# Patient Record
Sex: MALE | Race: WHITE | NOT HISPANIC OR LATINO | URBAN - METROPOLITAN AREA
[De-identification: names, ages, dates, MRNs, and addresses within clinical notes are randomized per-mention and may not be internally consistent; named-entity substitution may affect disease eponyms.]

---

## 2023-06-29 ENCOUNTER — APPOINTMENT (OUTPATIENT)
Dept: LAB | Facility: HOSPITAL | Age: 38
End: 2023-06-29
Payer: COMMERCIAL

## 2023-06-29 DIAGNOSIS — E78.00 PURE HYPERCHOLESTEROLEMIA: ICD-10-CM

## 2023-06-29 DIAGNOSIS — R53.81 DEBILITY: ICD-10-CM

## 2023-06-29 LAB
ALBUMIN SERPL BCP-MCNC: 4.7 G/DL (ref 3.5–5)
ALP SERPL-CCNC: 29 U/L (ref 34–104)
ALT SERPL W P-5'-P-CCNC: 17 U/L (ref 7–52)
ANION GAP SERPL CALCULATED.3IONS-SCNC: 4 MMOL/L
AST SERPL W P-5'-P-CCNC: 20 U/L (ref 13–39)
BASOPHILS # BLD AUTO: 0.05 THOUSANDS/ÂΜL (ref 0–0.1)
BASOPHILS NFR BLD AUTO: 1 % (ref 0–1)
BILIRUB SERPL-MCNC: 1.05 MG/DL (ref 0.2–1)
BUN SERPL-MCNC: 19 MG/DL (ref 5–25)
CALCIUM SERPL-MCNC: 9.6 MG/DL (ref 8.4–10.2)
CHLORIDE SERPL-SCNC: 102 MMOL/L (ref 96–108)
CHOLEST SERPL-MCNC: 234 MG/DL
CO2 SERPL-SCNC: 30 MMOL/L (ref 21–32)
CREAT SERPL-MCNC: 1.12 MG/DL (ref 0.6–1.3)
EOSINOPHIL # BLD AUTO: 0.08 THOUSAND/ÂΜL (ref 0–0.61)
EOSINOPHIL NFR BLD AUTO: 2 % (ref 0–6)
ERYTHROCYTE [DISTWIDTH] IN BLOOD BY AUTOMATED COUNT: 12.6 % (ref 11.6–15.1)
GFR SERPL CREATININE-BSD FRML MDRD: 84 ML/MIN/1.73SQ M
GLUCOSE P FAST SERPL-MCNC: 89 MG/DL (ref 65–99)
HCT VFR BLD AUTO: 43 % (ref 36.5–49.3)
HDLC SERPL-MCNC: 47 MG/DL
HGB BLD-MCNC: 14.1 G/DL (ref 12–17)
IMM GRANULOCYTES # BLD AUTO: 0.01 THOUSAND/UL (ref 0–0.2)
IMM GRANULOCYTES NFR BLD AUTO: 0 % (ref 0–2)
LDLC SERPL CALC-MCNC: 165 MG/DL (ref 0–100)
LYMPHOCYTES # BLD AUTO: 1.78 THOUSANDS/ÂΜL (ref 0.6–4.47)
LYMPHOCYTES NFR BLD AUTO: 37 % (ref 14–44)
MCH RBC QN AUTO: 29.5 PG (ref 26.8–34.3)
MCHC RBC AUTO-ENTMCNC: 32.8 G/DL (ref 31.4–37.4)
MCV RBC AUTO: 90 FL (ref 82–98)
MONOCYTES # BLD AUTO: 0.41 THOUSAND/ÂΜL (ref 0.17–1.22)
MONOCYTES NFR BLD AUTO: 8 % (ref 4–12)
NEUTROPHILS # BLD AUTO: 2.55 THOUSANDS/ÂΜL (ref 1.85–7.62)
NEUTS SEG NFR BLD AUTO: 52 % (ref 43–75)
NONHDLC SERPL-MCNC: 187 MG/DL
NRBC BLD AUTO-RTO: 0 /100 WBCS
PLATELET # BLD AUTO: 189 THOUSANDS/UL (ref 149–390)
PMV BLD AUTO: 10.3 FL (ref 8.9–12.7)
POTASSIUM SERPL-SCNC: 4.1 MMOL/L (ref 3.5–5.3)
PROT SERPL-MCNC: 7.7 G/DL (ref 6.4–8.4)
RBC # BLD AUTO: 4.78 MILLION/UL (ref 3.88–5.62)
SODIUM SERPL-SCNC: 136 MMOL/L (ref 135–147)
TRIGL SERPL-MCNC: 108 MG/DL
WBC # BLD AUTO: 4.88 THOUSAND/UL (ref 4.31–10.16)

## 2023-06-29 PROCEDURE — 80053 COMPREHEN METABOLIC PANEL: CPT

## 2023-06-29 PROCEDURE — 80061 LIPID PANEL: CPT

## 2023-06-29 PROCEDURE — 85025 COMPLETE CBC W/AUTO DIFF WBC: CPT

## 2023-06-29 PROCEDURE — 36415 COLL VENOUS BLD VENIPUNCTURE: CPT

## 2023-08-10 ENCOUNTER — OFFICE VISIT (OUTPATIENT)
Dept: OBGYN CLINIC | Facility: CLINIC | Age: 38
End: 2023-08-10
Payer: COMMERCIAL

## 2023-08-10 ENCOUNTER — APPOINTMENT (OUTPATIENT)
Dept: RADIOLOGY | Facility: CLINIC | Age: 38
End: 2023-08-10
Payer: COMMERCIAL

## 2023-08-10 VITALS — WEIGHT: 187 LBS | BODY MASS INDEX: 26.18 KG/M2 | HEIGHT: 71 IN

## 2023-08-10 DIAGNOSIS — M25.522 PAIN IN LEFT ELBOW: ICD-10-CM

## 2023-08-10 DIAGNOSIS — M25.522 PAIN IN LEFT ELBOW: Primary | ICD-10-CM

## 2023-08-10 DIAGNOSIS — M24.129 INTERNAL DERANGEMENT OF ELBOW: ICD-10-CM

## 2023-08-10 PROCEDURE — 73080 X-RAY EXAM OF ELBOW: CPT

## 2023-08-10 PROCEDURE — 99204 OFFICE O/P NEW MOD 45 MIN: CPT | Performed by: ORTHOPAEDIC SURGERY

## 2023-08-10 RX ORDER — ESCITALOPRAM OXALATE 10 MG/1
TABLET ORAL
COMMUNITY
Start: 2023-05-19

## 2023-08-10 NOTE — PROGRESS NOTES
Patient Name:  Марина Butler  MRN:  [id number]    [id number] [address] [facility]     1. Pain in left elbow  -     XR elbow 3+ vw left; Future; Expected date: 08/10/2023    2. Internal derangement of elbow  -     MRI elbow left wo contrast; Future; Expected date: 08/10/2023      Left elbow internal derangement   · X-rays reviewed in office today with patient   · In light of patient's persistent elbow pain and weakness for 1 year despite therapy, OTC medications, activity modifications, will move forward with MRI to evaluate internal derangement, evaluate brachialis and distal biceps tendon for pathology, possible partial tear. · In the meantime, advised patient to continue with home exercises, OTC medications and activity modifications as needed. Can also consider cock up wrist splint to prevent exacerbating symptoms. · Will hold off on injection therapy at this time   · Patient understanding of the above  · Follow up in office after MRI resulted to discuss continued treatment        Chief Complaint     Left elbow pain    History of the Present Illness     Марина Butler is a 41 y.o. male with Left elbow pain ongoing for about 12 months and recently worsening. Patient does admit to history of ORIF of the elbow after an accident many years ago when he was in [employer] while playing football. He admits to a fracture dislocation of the Left elbow. Recently, he was performing reverse curls and noticed some anterior elbow discomfort. He admits to pain when performing motions that require him to pronate and flex the elbow. He had some pain with ridiing his road bike, as well. He locates pain to the distal biceps tendon and sometimes into the lateral aspect of the forearm. He admits pain is worse in the morning. Denies nighttime awakenings. He denies numbness or tingling, fevers or chills. He has tried therapy, OTC medications, activity modifications, decreasing frequency at the gym without pain relief.  He continues to perform exercises at home. Review of Systems     Review of Systems   Constitutional: Negative for chills and fever. HENT: Negative for ear pain and sore throat. Eyes: Negative for pain and visual disturbance. Respiratory: Negative for cough and shortness of breath. Cardiovascular: Negative for chest pain and palpitations. Gastrointestinal: Negative for abdominal pain and vomiting. Genitourinary: Negative for dysuria and hematuria. Musculoskeletal: Negative for arthralgias and back pain. Skin: Negative for color change and rash. Neurological: Negative for seizures and syncope. All other systems reviewed and are negative. Physical Exam     Ht 5' 11" (1.803 m)   Wt 84.8 kg (187 lb)   BMI 26.08 kg/m²     Left Elbow    Minimal tenderness to palpation over anterolateral distal humerus, brachialis   Valgus and varus stress testing without instablity  Active range of motion  0 to 145 degrees without pain  Pain with resisted elbow flexion and supination  Full composite fist  5/5 finger flexion, finger abduction, 4+/5 elbow flexion, 5/5 triceps   Negative hook test  Neurovascularly intact distally      Eyes:  Anicteric sclerae. Neck:  Supple. Lungs:  Normal respiratory effort. Cardiovascular:  Capillary refill is less than 2 seconds. Skin:  Intact without erythema. Neurologic:  Sensation grossly intact to light touch. Psychiatric:  Mood and affect are appropriate. Data Review     I have personally reviewed pertinent films in PACS, and my interpretation follows:    X-rays performed 08/10/2023 of Left elbow independently reviewed and demonstrate history of ORIF medial epicondyle. No acute fracture, dislocation or significant degenerative changes noted. History reviewed. No pertinent past medical history. History reviewed. No pertinent surgical history.     No Known Allergies    Current Outpatient Medications on File Prior to Visit   Medication Sig Dispense Refill   • escitalopram (Lit Lockhart) 10 mg tablet  (Patient not taking: Reported on 8/10/2023)       No current facility-administered medications on file prior to visit. History reviewed. No pertinent family history.           Procedures Performed     Procedures  None       Arin Billings PA-C

## 2023-08-22 ENCOUNTER — HOSPITAL ENCOUNTER (OUTPATIENT)
Dept: MRI IMAGING | Facility: HOSPITAL | Age: 38
Discharge: HOME | End: 2023-08-22
Payer: COMMERCIAL

## 2023-08-22 DIAGNOSIS — M24.129 INTERNAL DERANGEMENT OF ELBOW: ICD-10-CM

## 2023-08-22 PROCEDURE — 73221 MRI JOINT UPR EXTREM W/O DYE: CPT

## 2023-08-22 PROCEDURE — G1004 CDSM NDSC: HCPCS

## 2023-09-01 DIAGNOSIS — G54.9 NERVE ROOT IRRITATION: Primary | ICD-10-CM

## 2023-09-01 RX ORDER — METHYLPREDNISOLONE 4 MG/1
TABLET ORAL
Qty: 1 EACH | Refills: 0 | Status: SHIPPED | OUTPATIENT
Start: 2023-09-01

## 2025-08-15 ENCOUNTER — APPOINTMENT (OUTPATIENT)
Dept: LAB | Facility: HOSPITAL | Age: 40
End: 2025-08-15
Payer: COMMERCIAL